# Patient Record
Sex: FEMALE | Race: AMERICAN INDIAN OR ALASKA NATIVE
[De-identification: names, ages, dates, MRNs, and addresses within clinical notes are randomized per-mention and may not be internally consistent; named-entity substitution may affect disease eponyms.]

---

## 2020-01-29 ENCOUNTER — HOSPITAL ENCOUNTER (OUTPATIENT)
Dept: HOSPITAL 5 - SPVWC | Age: 75
Discharge: HOME | End: 2020-01-29
Attending: SURGERY
Payer: MEDICARE

## 2020-01-29 DIAGNOSIS — R92.8: Primary | ICD-10-CM

## 2020-01-29 PROCEDURE — 77066 DX MAMMO INCL CAD BI: CPT

## 2020-01-29 NOTE — MAMMOGRAPHY REPORT
BILATERAL DIGITAL DIAGNOSTIC MAMMOGRAM WITH CAD  -- 1/29/2020

LEFT LIMITED BREAST ULTRASOUND

 

INDICATION: Evaluate findings noted in both breasts on recent screening mammogram. 



TECHNIQUE:  Digital bilateral mammographic imaging was performed. Spot compression views were obtaine
d. Limited ultrasound was performed. This examination was interpreted with the benefit of Computer-
ded Detection (CAD) analysis. 



COMPARISON: 11/6/2019, 7/9/2014, 12/17/2010.



FINDINGS: 



Additional views focused in the right superior breast were performed to evaluate the site of previous
ly noted asymmetry which was seen in this region on the recent screening mammogram. The finding effac
es and demonstrates a stable mammographic appearance compared to multiple prior mammograms. A large l
ipoma within the right lateral breast appears unchanged. Additionally, spot views focused in the retr
oareolar posterior left breast show persistence of an oval, possibly reniform, shaped isodense lesion
 located far posteriorly. This may represent a lymph node. A targeted ultrasound will be performed fo
r confirmation.



A targeted ultrasound focused left breast in the region of interest demonstrates a benign-appearing i
ntramammary lymph node at the 3:30 position, 12 cm from the nipple. Cortical thickness measures up to
 1.3 mm which is within normal limits. This would correspond with the mammographic finding seen in th
is region. No evidence of malignancy.



IMPRESSION:



No evidence of malignancy. Benign-appearing left lateral posterior breast intramammary lymph node. Ro
utine screening mammogram due in November 2020 is recommended.



BI-RADS Category 2:  Benign. Recommend routine screening mammography in one year





A "normal" or negative report should not discourage follow up or biopsy of a clinically significant f
inding.



A written summary of these findings will be mailed to the patient. The patient will be entered into a
 mammography reporting system which will generate a reminder letter for the patient's next appointmen
t at the appropriate interval.



According to the [employer], yearly mammograms are recommended starting at age 40 
and continuing as long as a woman is in good health.  Breast MRI is recommended for women with an gian
roximately 20-25% or greater lifetime risk of breast cancer, including women with a strong family his
tory of breast or ovarian cancer and women who have been treated for Hodgkin's disease.



Signer Name: Nigel Hernandez MD 

Signed: 1/29/2020 11:04 AM

 Workstation Name: [id number]

## 2020-01-29 NOTE — ULTRASOUND REPORT
BILATERAL DIGITAL DIAGNOSTIC MAMMOGRAM WITH CAD  -- 1/29/2020

LEFT LIMITED BREAST ULTRASOUND

 

INDICATION: Evaluate findings noted in both breasts on recent screening mammogram. 



TECHNIQUE:  Digital bilateral mammographic imaging was performed. Spot compression views were obtaine
d. Limited ultrasound was performed. This examination was interpreted with the benefit of Computer-
ded Detection (CAD) analysis. 



COMPARISON: 11/6/2019, 7/9/2014, 12/17/2010.



FINDINGS: 



Additional views focused in the right superior breast were performed to evaluate the site of previous
ly noted asymmetry which was seen in this region on the recent screening mammogram. The finding effac
es and demonstrates a stable mammographic appearance compared to multiple prior mammograms. A large l
ipoma within the right lateral breast appears unchanged. Additionally, spot views focused in the retr
oareolar posterior left breast show persistence of an oval, possibly reniform, shaped isodense lesion
 located far posteriorly. This may represent a lymph node. A targeted ultrasound will be performed fo
r confirmation.



A targeted ultrasound focused left breast in the region of interest demonstrates a benign-appearing i
ntramammary lymph node at the 3:30 position, 12 cm from the nipple. Cortical thickness measures up to
 1.3 mm which is within normal limits. This would correspond with the mammographic finding seen in th
is region. No evidence of malignancy.



IMPRESSION:



No evidence of malignancy. Benign-appearing left lateral posterior breast intramammary lymph node. Ro
utine screening mammogram due in November 2020 is recommended.



BI-RADS Category 2:  Benign. Recommend routine screening mammography in one year





A "normal" or negative report should not discourage follow up or biopsy of a clinically significant f
inding.



A written summary of these findings will be mailed to the patient. The patient will be entered into a
 mammography reporting system which will generate a reminder letter for the patient's next appointmen
t at the appropriate interval.



According to the [employer], yearly mammograms are recommended starting at age 40 
and continuing as long as a woman is in good health.  Breast MRI is recommended for women with an gian
roximately 20-25% or greater lifetime risk of breast cancer, including women with a strong family his
tory of breast or ovarian cancer and women who have been treated for Hodgkin's disease.



Signer Name: Nigel Hernandez MD 

Signed: 1/29/2020 11:04 AM

 Workstation Name: [id number]